# Patient Record
(demographics unavailable — no encounter records)

---

## 2025-03-20 NOTE — END OF VISIT
[FreeTextEntry3] :  I, Dr. Yi, personally performed the evaluation and management (E/M) services for this new patient.  That E/M includes conducting the clinically appropriate initial history &/or exam, assessing all conditions, and establishing the plan of care.  Today, my BERYL, S&N Airoflo Ye, was here to observe my evaluation and management service for this patient & follow plan of care established by me going forward.

## 2025-03-20 NOTE — ASSESSMENT
[FreeTextEntry1] : 62yo M peyrocatarino had erection at time of exam - ventral cruve with hourglass may not require ICI at time of PDUS for CA and PDUS consider intralsional verapamil

## 2025-03-20 NOTE — HISTORY OF PRESENT ILLNESS
[FreeTextEntry1] : TALIA PAGAN is a 61 year M presenting on 03/20/2025 PMH: HTN, HLD, GERD Referred by: debbie : Denisse [id number]  Distal ventral and left penile curve. First noticed 5 years ago. 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 Given topical verapamil cream. Denies pain from deformities. Also concerned with penile length. Some difficulty with intercourse due to loss of length. Has RX tadalafil 5mg. Says he doesn't need if the woman is good-looking Did biopsy with Dr Melo of suspicious dark penile skin lesions one month ago and told ok.  Checking prostate/PSA with PCP annually. Told healthy  Mother has some type of GYN cancer current cigar smoker  1/2025:  LH 10.2 PSA 0.66

## 2025-03-20 NOTE — PHYSICAL EXAM
[Normal Appearance] : normal appearance [Well Groomed] : well groomed [General Appearance - In No Acute Distress] : no acute distress [Respiration, Rhythm And Depth] : normal respiratory rhythm and effort [Exaggerated Use Of Accessory Muscles For Inspiration] : no accessory muscle use [Urethral Meatus] : meatus normal [Penis Abnormality] : normal circumcised penis [Scrotum] : the scrotum was normal [Testes Tenderness] : no tenderness of the testes [Testes Mass (___cm)] : there were no testicular masses [Normal Station and Gait] : the gait and station were normal for the patient's age [] : no rash [No Focal Deficits] : no focal deficits [Oriented To Time, Place, And Person] : oriented to person, place, and time [Affect] : the affect was normal [Mood] : the mood was normal [de-identified] : B/L 20cc testes, no masses. Small mid-shaft dorsal plaque. erection noted at time of exam. ventral curve with hourglass

## 2025-04-02 NOTE — HISTORY OF PRESENT ILLNESS
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 First noticed 5 years ago. Was given topical verapamil cream in the past.  Denies pain with PD.  Also concerned with penile length. Some difficulty with intercourse due to loss of length. Has RX tadalafil 5mg. Says he doesn't need if the woman is good-looking Did biopsy with Dr Melo of suspicious dark penile skin lesions one month ago and told ok. Current cigarette smoker.   1/2025:  LH 10.2 PSA 0.66

## 2025-04-02 NOTE — ASSESSMENT
[FreeTextEntry1] : PD PDUS today revealed 10/10 erection with 5 units Trimix --> hourglass deformity at penile midshaft as well as sloping of corona ~15 degrees ventrally , 4.8 mm mildly calcified plaque at area of midshaft hourglass deformity required 1000 mcg phenylephrine for detumescence minimal defomity severe concern of risk of ED with treatment I clearly recommend observation  The role of intralesional therapy, whether verapamil or interferon, were discussed. He understands that there is no level 1 evidence in support of intralesional treatments in this phase. Retrospective data is suggestive of an improvement in curvature in a minority of men who receive treatment and that intralesional treatments may help to stabilize the plaque in some. He also understands that a percentage of patietns will worsen even with intralesional treatment. very limited role for surgery or xiaflex here will consider if opts to proceed with verapamil will schedule

## 2025-04-02 NOTE — HISTORY OF PRESENT ILLNESS
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 First noticed 5 years ago. Was given topical verapamil cream in the past.  Denies pain with PD.  Also concerned with penile length. Some difficulty with intercourse due to loss of length. Has RX tadalafil 5mg. Says he doesn't need if the woman is good-looking Did biopsy with Dr Melo of suspicious dark penile skin lesions one month ago and told ok. Current cigarette smoker.   1/2025:  LH 10.2 PSA 0.66